# Patient Record
Sex: MALE | Race: WHITE | NOT HISPANIC OR LATINO | Employment: FULL TIME | URBAN - NONMETROPOLITAN AREA
[De-identification: names, ages, dates, MRNs, and addresses within clinical notes are randomized per-mention and may not be internally consistent; named-entity substitution may affect disease eponyms.]

---

## 2020-08-29 ENCOUNTER — HOSPITAL ENCOUNTER (EMERGENCY)
Facility: HOSPITAL | Age: 30
Discharge: HOME | End: 2020-08-29
Attending: EMERGENCY MEDICINE
Payer: COMMERCIAL

## 2020-08-29 VITALS
OXYGEN SATURATION: 99 % | TEMPERATURE: 97.7 F | SYSTOLIC BLOOD PRESSURE: 162 MMHG | RESPIRATION RATE: 18 BRPM | DIASTOLIC BLOOD PRESSURE: 100 MMHG | HEART RATE: 124 BPM

## 2020-08-29 DIAGNOSIS — L03.90 CELLULITIS: Primary | ICD-10-CM

## 2020-08-29 PROCEDURE — 86618 LYME DISEASE ANTIBODY: CPT | Performed by: EMERGENCY MEDICINE

## 2020-08-29 PROCEDURE — 99284 EMERGENCY DEPT VISIT MOD MDM: CPT | Performed by: EMERGENCY MEDICINE

## 2020-08-29 PROCEDURE — 99283 EMERGENCY DEPT VISIT LOW MDM: CPT

## 2020-08-29 PROCEDURE — 36415 COLL VENOUS BLD VENIPUNCTURE: CPT | Performed by: EMERGENCY MEDICINE

## 2020-08-29 RX ORDER — SULFAMETHOXAZOLE AND TRIMETHOPRIM 800; 160 MG/1; MG/1
1 TABLET ORAL ONCE
Status: COMPLETED | OUTPATIENT
Start: 2020-08-29 | End: 2020-08-29

## 2020-08-29 RX ORDER — CEPHALEXIN 500 MG/1
500 CAPSULE ORAL EVERY 6 HOURS SCHEDULED
Qty: 40 CAPSULE | Refills: 0 | Status: SHIPPED | OUTPATIENT
Start: 2020-08-29 | End: 2020-09-08

## 2020-08-29 RX ORDER — CEPHALEXIN 250 MG/1
500 CAPSULE ORAL ONCE
Status: COMPLETED | OUTPATIENT
Start: 2020-08-29 | End: 2020-08-29

## 2020-08-29 RX ORDER — SULFAMETHOXAZOLE AND TRIMETHOPRIM 800; 160 MG/1; MG/1
1 TABLET ORAL 2 TIMES DAILY
Qty: 20 TABLET | Refills: 0 | Status: SHIPPED | OUTPATIENT
Start: 2020-08-29 | End: 2020-09-08

## 2020-08-29 RX ORDER — AMLODIPINE BESYLATE 5 MG/1
5 TABLET ORAL DAILY
COMMUNITY

## 2020-08-29 RX ADMIN — CEPHALEXIN 500 MG: 250 CAPSULE ORAL at 23:04

## 2020-08-29 RX ADMIN — SULFAMETHOXAZOLE AND TRIMETHOPRIM 1 TABLET: 800; 160 TABLET ORAL at 23:04

## 2020-08-30 NOTE — DISCHARGE INSTRUCTIONS
Warm compresses to the area  Take ibuprofen as needed  Please follow-up with your family doctor on Monday  Return if symptoms worsen

## 2020-08-30 NOTE — ED PROVIDER NOTES
History  Chief Complaint   Patient presents with    Foot Swelling     Patient reports small red area to right ankle that he first noticed on Thursday night  Since then pain, swelling and redness has has gotten worse  Was seen at PCP yesterday and states the took blood work but unsure of what they ordered  Complained of a small red round area to the right ankle on Thursday  Now with increasing swelling and redness  No fevers or chills  No nausea vomiting diarrhea  Relief with ibuprofen  Not diabetic  History provided by:  Patient   used: No    Medical Problem   Location:  Right ankle swelling  Quality:  Achy  Severity:  Mild  Onset quality:  Gradual  Duration:  2 days  Timing:  Constant  Progression:  Worsening  Chronicity:  New  Context:  Possible bug bite  Relieved by:  Ibuprofen  Worsened by:  Weight-bearing  Ineffective treatments:  None tried  Associated symptoms: no abdominal pain, no chest pain, no congestion, no cough, no diarrhea, no ear pain, no fever, no headaches, no nausea, no rash, no rhinorrhea, no shortness of breath, no sore throat, no vomiting and no wheezing        Prior to Admission Medications   Prescriptions Last Dose Informant Patient Reported? Taking? amLODIPine (NORVASC) 5 mg tablet   Yes Yes   Sig: Take 5 mg by mouth daily      Facility-Administered Medications: None       Past Medical History:   Diagnosis Date    Hypertension        Past Surgical History:   Procedure Laterality Date    CHOLECYSTECTOMY      HERNIA REPAIR      KNEE SURGERY         History reviewed  No pertinent family history  I have reviewed and agree with the history as documented      E-Cigarette/Vaping    E-Cigarette Use Never User      E-Cigarette/Vaping Substances     Social History     Tobacco Use    Smoking status: Never Smoker    Smokeless tobacco: Never Used   Substance Use Topics    Alcohol use: Yes     Frequency: Monthly or less    Drug use: Not Currently       Review of Systems   Constitutional: Negative for chills and fever  HENT: Negative for congestion, ear pain, hearing loss, rhinorrhea, sore throat, trouble swallowing and voice change  Eyes: Negative for pain and discharge  Respiratory: Negative for cough, shortness of breath and wheezing  Cardiovascular: Negative for chest pain and palpitations  Gastrointestinal: Negative for abdominal pain, blood in stool, constipation, diarrhea, nausea and vomiting  Genitourinary: Negative for dysuria, flank pain, frequency and hematuria  Musculoskeletal: Negative for joint swelling, neck pain and neck stiffness  Skin: Negative for rash and wound  Neurological: Negative for dizziness, seizures, syncope, facial asymmetry and headaches  Psychiatric/Behavioral: Negative for hallucinations, self-injury and suicidal ideas  All other systems reviewed and are negative  Physical Exam  Physical Exam  Constitutional:       Appearance: Normal appearance  HENT:      Head: Normocephalic  Musculoskeletal:      Comments: Right ankle with erythema and warmth on the medial aspect just posterior to the medial malleolus  Dorsalis pedis pulses 2+  No definite fluctuance or discharge noted  Neurological:      Mental Status: He is alert           Vital Signs  ED Triage Vitals [08/29/20 2254]   Temperature Pulse Respirations Blood Pressure SpO2   97 7 °F (36 5 °C) (!) 120 18 (!) 165/101 99 %      Temp Source Heart Rate Source Patient Position - Orthostatic VS BP Location FiO2 (%)   Temporal Monitor Sitting Right arm --      Pain Score       7           Vitals:    08/29/20 2254   BP: (!) 165/101   Pulse: (!) 120   Patient Position - Orthostatic VS: Sitting         Visual Acuity      ED Medications  Medications   sulfamethoxazole-trimethoprim (BACTRIM DS) 800-160 mg per tablet 1 tablet (has no administration in time range)   cephalexin (KEFLEX) capsule 500 mg (has no administration in time range)       Diagnostic Studies  Results Reviewed     Procedure Component Value Units Date/Time    Lyme Antibody Profile with reflex to WB [[id number]]     Lab Status:  No result Specimen:  Blood                  No orders to display              Procedures  Procedures         ED Course       US AUDIT      Most Recent Value   Initial Alcohol Screen: US AUDIT-C    1  How often do you have a drink containing alcohol? 2 Filed at: 08/29/2020 2255   2  How many drinks containing alcohol do you have on a typical day you are drinking? 1 Filed at: 08/29/2020 2255   3a  Male UNDER 65: How often do you have five or more drinks on one occasion? 1 Filed at: 08/29/2020 2255   Audit-C Score  4 Filed at: 08/29/2020 2255                  ARMAAN/DAST-10      Most Recent Value   How many times in the past year have you    Used an illegal drug or used a prescription medication for non-medical reasons? Never Filed at: 08/29/2020 2256                                MDM      Disposition  Final diagnoses:   Cellulitis     Time reflects when diagnosis was documented in both MDM as applicable and the Disposition within this note     Time User Action Codes Description Comment    8/29/2020 10:58 PM Iván Patriciaole Add [L03 90] Cellulitis       ED Disposition     ED Disposition Condition Date/Time Comment    Discharge Stable Sat Aug 29, 2020 10:58 PM Deb Dry discharge to home/self care              Follow-up Information    None         Patient's Medications   Discharge Prescriptions    CEPHALEXIN (KEFLEX) 500 MG CAPSULE    Take 1 capsule (500 mg total) by mouth every 6 (six) hours for 10 days       Start Date: 8/29/2020 End Date: 9/8/2020       Order Dose: 500 mg       Quantity: 40 capsule    Refills: 0    SULFAMETHOXAZOLE-TRIMETHOPRIM (BACTRIM DS) 800-160 MG PER TABLET    Take 1 tablet by mouth 2 (two) times a day for 10 days smx-tmp DS (BACTRIM) 800-160 mg tabs (1tab q12 D10)       Start Date: 8/29/2020 End Date: 9/8/2020       Order Dose: 1 tablet Quantity: 20 tablet    Refills: 0     No discharge procedures on file      PDMP Review     None          ED Provider  Electronically Signed by           Eder Yates MD  08/29/20 1088

## 2020-09-01 LAB — B BURGDOR IGG+IGM SER-ACNC: <0.91 ISR (ref 0–0.9)

## 2021-08-07 ENCOUNTER — HOSPITAL ENCOUNTER (EMERGENCY)
Facility: HOSPITAL | Age: 31
Discharge: HOME | End: 2021-08-07
Attending: STUDENT IN AN ORGANIZED HEALTH CARE EDUCATION/TRAINING PROGRAM
Payer: COMMERCIAL

## 2021-08-07 VITALS
HEART RATE: 68 BPM | HEIGHT: 70 IN | DIASTOLIC BLOOD PRESSURE: 101 MMHG | RESPIRATION RATE: 20 BRPM | SYSTOLIC BLOOD PRESSURE: 175 MMHG | WEIGHT: 225 LBS | BODY MASS INDEX: 32.21 KG/M2 | OXYGEN SATURATION: 100 % | TEMPERATURE: 96.8 F

## 2021-08-07 DIAGNOSIS — M62.838 TRAPEZIUS MUSCLE SPASM: Primary | ICD-10-CM

## 2021-08-07 PROCEDURE — 99284 EMERGENCY DEPT VISIT MOD MDM: CPT | Performed by: STUDENT IN AN ORGANIZED HEALTH CARE EDUCATION/TRAINING PROGRAM

## 2021-08-07 PROCEDURE — 99283 EMERGENCY DEPT VISIT LOW MDM: CPT

## 2021-08-07 PROCEDURE — 96372 THER/PROPH/DIAG INJ SC/IM: CPT

## 2021-08-07 PROCEDURE — 20552 NJX 1/MLT TRIGGER POINT 1/2: CPT | Performed by: STUDENT IN AN ORGANIZED HEALTH CARE EDUCATION/TRAINING PROGRAM

## 2021-08-07 RX ORDER — TRIAMCINOLONE ACETONIDE 40 MG/ML
40 INJECTION, SUSPENSION INTRA-ARTICULAR; INTRAMUSCULAR ONCE
Status: COMPLETED | OUTPATIENT
Start: 2021-08-07 | End: 2021-08-07

## 2021-08-07 RX ORDER — LIDOCAINE 50 MG/G
1 PATCH TOPICAL ONCE
Status: DISCONTINUED | OUTPATIENT
Start: 2021-08-07 | End: 2021-08-07 | Stop reason: HOSPADM

## 2021-08-07 RX ORDER — DIAZEPAM 5 MG/1
5 TABLET ORAL EVERY 6 HOURS PRN
Qty: 6 TABLET | Refills: 0 | Status: SHIPPED | OUTPATIENT
Start: 2021-08-07

## 2021-08-07 RX ADMIN — LIDOCAINE 1 PATCH: 50 PATCH TOPICAL at 09:17

## 2021-08-07 RX ADMIN — TRIAMCINOLONE ACETONIDE 40 MG: 40 INJECTION, SUSPENSION INTRA-ARTICULAR; INTRAMUSCULAR at 09:17

## 2021-08-07 NOTE — DISCHARGE INSTRUCTIONS
You were evaluated in the emergency department for bilateral neck/upper back pain  You are administered to trigger point injections which provided relief  For pain, you can take Motrin 600 mg every 6 hours and/or Tylenol 1000 mg every 6 hours  You can also apply Aspercreme/Salonpas lidocaine patches  You are also being provided with few doses of Valium which helped with muscle relaxation  Do not drink alcohol/drive while taking Valium  Do not hesitate to return to the emergency department for any concerning signs or symptoms

## 2021-08-07 NOTE — Clinical Note
Lianet Hurd was seen and treated in our emergency department on 8/7/2021  Diagnosis:     Margi Branch    He may return on this date:     Mr Lianet Hurd is limited to light duty on 8/8, 8/9, 8/10  If you have any questions or concerns, please don't hesitate to call        Micky Srivastava DO    ______________________________           _______________          _______________  Hospital Representative                              Date                                Time

## 2021-08-07 NOTE — ED PROVIDER NOTES
History  Chief Complaint   Patient presents with    Neck Pain     pt states neck pain started 4 days ago, thought he just slept on it wrong but nothing is working for pain relief at home  pain progressively getting worse  pt denies fevers, numbness tingling in arms  no headache       History provided by:  Patient  Neck Pain  Pain location:  L side and R side  Quality:  Stiffness and aching  Stiffness is present:  All day  Radiates to: Bilateral trapezius  Pain severity:  Moderate  Pain is:  Unable to specify  Onset quality:  Gradual  Timing:  Intermittent  Progression:  Worsening  Chronicity:  New  Context: not fall, not jumping from heights, not MVC and not recent injury    Relieved by:  Position  Worsened by:  Twisting and bending  Ineffective treatments:  NSAIDs, muscle relaxants and analgesics  Associated symptoms: no chest pain, no fever, no headaches, no numbness and no weakness       26year old M  States that he has been having worsening left sided neck stiffness/pain x 4 days  No known injury  Localizes the stiffness/pain to the bilateral trapezius muscles with radiation to the cervical paraspinal musculature  Describes the pain as achy in nature and 7/10 in severity  The pain is only present when he twists/turns his neck/torso  Has been taking Aleve/Tylenol without relief  Took a dose of Flexeril yesterday without relief  Denies numbness/tingling/weakness in his upper extremities  Prior to Admission Medications   Prescriptions Last Dose Informant Patient Reported? Taking? amLODIPine (NORVASC) 5 mg tablet 8/6/2021 at Unknown time  Yes Yes   Sig: Take 5 mg by mouth daily      Facility-Administered Medications: None     Past Medical History:   Diagnosis Date    Hypertension      Past Surgical History:   Procedure Laterality Date    CHOLECYSTECTOMY      HERNIA REPAIR      KNEE SURGERY       History reviewed  No pertinent family history    I have reviewed and agree with the history as documented  E-Cigarette/Vaping    E-Cigarette Use Never User      E-Cigarette/Vaping Substances     Social History     Tobacco Use    Smoking status: Never Smoker    Smokeless tobacco: Never Used   Vaping Use    Vaping Use: Never used   Substance Use Topics    Alcohol use: Yes    Drug use: Not Currently     Review of Systems   Constitutional: Negative for chills and fever  HENT: Negative for congestion, rhinorrhea, sinus pressure and sinus pain  Eyes: Negative for pain, redness and visual disturbance  Respiratory: Negative for apnea, cough, shortness of breath and wheezing  Cardiovascular: Negative for chest pain and palpitations  Gastrointestinal: Negative for abdominal pain, diarrhea, nausea and vomiting  Genitourinary: Negative for difficulty urinating, dysuria, flank pain and urgency  Musculoskeletal: Positive for back pain, myalgias, neck pain and neck stiffness  Skin: Negative for color change, rash and wound  Neurological: Negative for dizziness, syncope, weakness, numbness and headaches  Hematological: Does not bruise/bleed easily  Psychiatric/Behavioral: Negative for confusion and sleep disturbance  The patient is not nervous/anxious  Physical Exam  Physical Exam  Vitals and nursing note reviewed  Constitutional:       General: He is in acute distress  Appearance: He is not ill-appearing  HENT:      Head: Normocephalic and atraumatic  Right Ear: External ear normal       Left Ear: External ear normal       Nose: Nose normal  No congestion or rhinorrhea  Mouth/Throat:      Mouth: Mucous membranes are moist       Pharynx: Oropharynx is clear  No oropharyngeal exudate or posterior oropharyngeal erythema  Eyes:      General:         Right eye: No discharge  Left eye: No discharge  Extraocular Movements: Extraocular movements intact  Conjunctiva/sclera: Conjunctivae normal       Pupils: Pupils are equal, round, and reactive to light  Cardiovascular:      Rate and Rhythm: Normal rate and regular rhythm  Pulses: Normal pulses  Heart sounds: Normal heart sounds  Pulmonary:      Effort: Pulmonary effort is normal       Breath sounds: Normal breath sounds  No wheezing, rhonchi or rales  Chest:      Chest wall: No tenderness  Abdominal:      General: Abdomen is flat  Palpations: Abdomen is soft  Tenderness: There is no abdominal tenderness  There is no right CVA tenderness, left CVA tenderness, guarding or rebound  Musculoskeletal:         General: Tenderness present  No swelling, deformity or signs of injury  Cervical back: Neck supple  No tenderness  Back:       Comments: Localized tenderness to palpation and hypertonicity along the left thoracic paraspinal musculature, right trapezius  No overlying skin changes or bruising noted  No midline back tenderness  Skin:     General: Skin is warm and dry  Capillary Refill: Capillary refill takes less than 2 seconds  Findings: No erythema or rash  Neurological:      General: No focal deficit present  Mental Status: He is alert and oriented to person, place, and time  Mental status is at baseline  Cranial Nerves: No cranial nerve deficit  Sensory: No sensory deficit  Motor: No weakness  Gait: Gait normal    Psychiatric:         Mood and Affect: Mood normal          Behavior: Behavior normal          Thought Content:  Thought content normal          Judgment: Judgment normal          Vital Signs  ED Triage Vitals [08/07/21 0901]   Temperature Pulse Respirations Blood Pressure SpO2   (!) 96 8 °F (36 °C) 68 20 (!) 175/101 100 %      Temp Source Heart Rate Source Patient Position - Orthostatic VS BP Location FiO2 (%)   Temporal Monitor Sitting Right arm --      Pain Score       2           Vitals:    08/07/21 0901   BP: (!) 175/101   Pulse: 68   Patient Position - Orthostatic VS: Sitting     ED Medications  Medications triamcinolone acetonide (KENALOG-40) 40 mg/mL injection 40 mg (40 mg Intramuscular Given by Other 8/7/21 7997)     Diagnostic Studies  Results Reviewed     None             No orders to display          Procedures  Trigger Injection 1 or 2 muscles    Date/Time: 8/7/2021 9:20 AM  Performed by: Jn Wakefield DO  Authorized by: Jn Wakefield DO     Patient location:  ED and bedside  Consent:     Consent obtained:  Verbal    Consent given by:  Patient    Risks discussed:  Infection, unsuccessful block, pain and bleeding  Universal protocol:     Procedure explained and questions answered to patient or proxy's satisfaction: yes      Site marked: yes    Location:     Body area:  Upper back and neck    Injection Trigger Points:  2  Pre-procedure details:     Skin preparation:  Antiseptic wash  Skin anesthesia:     Skin anesthesia method:  None  Procedure details:     Needle gauge:  25 G    Anesthetic injected:  Lidocaine 1% w/o epi    Steroid injected:  Triamcinolone    Additive injected:  None    Injection procedure:  Anatomic landmarks identified, incremental injection, negative aspiration for blood, anatomic landmarks palpated and introduced needle  Post-procedure details:     Dressing:  None    Outcome:  Pain relieved    Patient tolerance of procedure: Tolerated well, no immediate complications      ED Course       MDM     41-year-old male  Presents to the emergency department with bilateral trapezius tenderness, hypertonicity  No known injury  On exam, the patient has hypertonicity along the bilateral trapezius, left thoracic paraspinal musculature  No tenderness to palpation along the midline spine  Trigger point injections were performed at the bedside which provided relief  See procedural note for further details  The patient was prescribed a short course of Valium PRN for muscle spasms  Other supportive care measures and return precautions were discussed with the patient    He expressed understanding  All questions were addressed  The patient was stable for discharge  Disposition  Final diagnoses:   Trapezius muscle spasm     Time reflects when diagnosis was documented in both MDM as applicable and the Disposition within this note     Time User Action Codes Description Comment    8/7/2021  9:27 AM Gayleen Osler Add [P49 204] Trapezius muscle spasm       ED Disposition     ED Disposition Condition Date/Time Comment    Discharge Stable Sat Aug 7, 2021  9:27 AM Garrison Cruz discharge to home/self care  Follow-up Information     Follow up With Specialties Details Why Contact Info    Deanne Carter DO Pediatrics  As needed [address] [facility] [address]  [phone]            Discharge Medication List as of 8/7/2021  9:30 AM      START taking these medications    Details   diazepam (VALIUM) 5 mg tablet Take 1 tablet (5 mg total) by mouth every 6 (six) hours as needed for muscle spasms, Starting Sat 8/7/2021, Normal         CONTINUE these medications which have NOT CHANGED    Details   amLODIPine (NORVASC) 5 mg tablet Take 5 mg by mouth daily, Historical Med           No discharge procedures on file      PDMP Review     None          ED Provider  Electronically Signed by           Liliana Hubbard DO  08/07/21 7893

## 2023-12-23 ENCOUNTER — OFFICE VISIT (OUTPATIENT)
Dept: URGENT CARE | Facility: CLINIC | Age: 33
End: 2023-12-23
Payer: COMMERCIAL

## 2023-12-23 VITALS
HEIGHT: 70 IN | TEMPERATURE: 96.1 F | OXYGEN SATURATION: 96 % | BODY MASS INDEX: 31.5 KG/M2 | WEIGHT: 220 LBS | SYSTOLIC BLOOD PRESSURE: 136 MMHG | DIASTOLIC BLOOD PRESSURE: 88 MMHG | RESPIRATION RATE: 18 BRPM | HEART RATE: 94 BPM

## 2023-12-23 DIAGNOSIS — R05.1 ACUTE COUGH: Primary | ICD-10-CM

## 2023-12-23 LAB
SARS-COV-2 AG UPPER RESP QL IA: NEGATIVE
VALID CONTROL: NORMAL

## 2023-12-23 PROCEDURE — 87811 SARS-COV-2 COVID19 W/OPTIC: CPT

## 2023-12-23 PROCEDURE — G0382 LEV 3 HOSP TYPE B ED VISIT: HCPCS

## 2023-12-23 RX ORDER — DEXTROMETHORPHAN HYDROBROMIDE AND PROMETHAZINE HYDROCHLORIDE 15; 6.25 MG/5ML; MG/5ML
5 SYRUP ORAL 4 TIMES DAILY PRN
Qty: 118 ML | Refills: 0 | Status: SHIPPED | OUTPATIENT
Start: 2023-12-23

## 2023-12-23 RX ORDER — PREDNISONE 10 MG/1
TABLET ORAL
Qty: 21 TABLET | Refills: 0 | Status: SHIPPED | OUTPATIENT
Start: 2023-12-23

## 2023-12-23 NOTE — PATIENT INSTRUCTIONS
Take steroid as prescribed  Use cough medication as needed  Plenty of fluids  Can use honey   Cool mist humidifier   Warm gargle with salt water for sore throat   Use Tylenol/ibuprofen as needed for fever or pain    Follow up with PCP in 3-5 days.  Proceed to  ER if symptoms worsen.

## 2023-12-23 NOTE — PROGRESS NOTES
[facility]        NAME: Timo Parker is a 33 y.o. male  : 1990    MRN: [id number]  DATE: 2023  TIME: 12:08 PM    Assessment and Plan   Acute cough [R05.1]  1. Acute cough  Poct Covid 19 Rapid Antigen Test    predniSONE 10 mg tablet    promethazine-dextromethorphan (PHENERGAN-DM) 6.25-15 mg/5 mL oral syrup        Rapid covid: neg    Patient Instructions     Take steroid as prescribed  Use cough medication as needed  Plenty of fluids  Can use honey   Cool mist humidifier   Warm gargle with salt water for sore throat   Use Tylenol/ibuprofen as needed for fever or pain    Follow up with PCP in 3-5 days.  Proceed to  ER if symptoms worsen.    Chief Complaint     Chief Complaint   Patient presents with    Cold Like Symptoms     Body aches, cough, chest congestion, yellow mucous, headaches, chills/hot flashes; symptoms started 4 days ago, does feel like he's a little better today.          History of Present Illness       URI   This is a new problem. Episode onset: 4 days. Associated symptoms include congestion, coughing (productive) and headaches. Pertinent negatives include no chest pain, ear pain, rhinorrhea, sneezing, sore throat or wheezing.       Review of Systems   Review of Systems   Constitutional:  Positive for chills. Negative for fatigue and fever.   HENT:  Positive for congestion. Negative for ear pain, postnasal drip, rhinorrhea, sinus pressure, sneezing, sore throat and tinnitus.    Eyes:  Negative for photophobia, redness and itching.   Respiratory:  Positive for cough (productive). Negative for chest tightness, shortness of breath and wheezing.    Cardiovascular:  Negative for chest pain.   Musculoskeletal:  Positive for myalgias.   Skin:  Negative for color change and pallor.   Neurological:  Positive for headaches. Negative for dizziness and light-headedness.   Psychiatric/Behavioral:  Negative for confusion.          Current Medications       Current Outpatient  "Medications:     amLODIPine (NORVASC) 5 mg tablet, Take 5 mg by mouth daily, Disp: , Rfl:     predniSONE 10 mg tablet, Take six pills on day 1, take five pills on day 2, take four pills on day 3, take three pills on day 4, take two pills on day 5, take one pill on day 6, Disp: 21 tablet, Rfl: 0    promethazine-dextromethorphan (PHENERGAN-DM) 6.25-15 mg/5 mL oral syrup, Take 5 mL by mouth 4 (four) times a day as needed for cough, Disp: 118 mL, Rfl: 0    diazepam (VALIUM) 5 mg tablet, Take 1 tablet (5 mg total) by mouth every 6 (six) hours as needed for muscle spasms (Patient not taking: Reported on 12/23/2023), Disp: 6 tablet, Rfl: 0    Current Allergies     Allergies as of 12/23/2023 - Reviewed 12/23/2023   Allergen Reaction Noted    Rosemary oil - food allergy  08/29/2020            The following portions of the patient's history were reviewed and updated as appropriate: allergies, current medications, past family history, past medical history, past social history, past surgical history and problem list.     Past Medical History:   Diagnosis Date    Hypertension        Past Surgical History:   Procedure Laterality Date    CHOLECYSTECTOMY      HERNIA REPAIR      KNEE SURGERY         Family History   Problem Relation Age of Onset    No Known Problems Mother     No Known Problems Father          Medications have been verified.        Objective   /88   Pulse 94   Temp (!) 96.1 °F (35.6 °C)   Resp 18   Ht 5' 10\" (1.778 m)   Wt 99.8 kg (220 lb)   SpO2 96%   BMI 31.57 kg/m²        Physical Exam     Physical Exam  Constitutional:       Appearance: Normal appearance.   HENT:      Head: Normocephalic.      Right Ear: Tympanic membrane and external ear normal.      Left Ear: Tympanic membrane and external ear normal.      Mouth/Throat:      Mouth: Mucous membranes are moist.      Pharynx: Oropharynx is clear.   Eyes:      Extraocular Movements: Extraocular movements intact.      Conjunctiva/sclera: Conjunctivae " normal.      Pupils: Pupils are equal, round, and reactive to light.   Cardiovascular:      Rate and Rhythm: Normal rate and regular rhythm.      Pulses: Normal pulses.      Heart sounds: Normal heart sounds.   Pulmonary:      Effort: Pulmonary effort is normal. No respiratory distress.      Breath sounds: Normal breath sounds. No stridor. No wheezing, rhonchi or rales.   Musculoskeletal:      Cervical back: No tenderness.   Lymphadenopathy:      Cervical: No cervical adenopathy.   Skin:     General: Skin is warm and dry.   Neurological:      General: No focal deficit present.      Mental Status: He is alert and oriented to person, place, and time. Mental status is at baseline.   Psychiatric:         Mood and Affect: Mood normal.         Behavior: Behavior normal.         Thought Content: Thought content normal.         Judgment: Judgment normal.

## 2025-01-02 ENCOUNTER — TELEPHONE (OUTPATIENT)
Age: 35
End: 2025-01-02

## 2025-01-02 NOTE — TELEPHONE ENCOUNTER
New Patient    What is the reason for the patient’s appointment?:patient called stating he would like to schedule vasectomy consult    Pt scheduled with Dr. D'amico for 01/22/25    What office location does the patient prefer?:[place]     Does patient have Imaging/Lab Results:    Have patient records been requested?:  If No, are the records showing in Epic:       INSURANCE:   Do we accept the patient's insurance or is the patient Self-Pay?:    Insurance Provider:[employer]   Plan Type/Number:   Member ID#:       HISTORY:   Has the patient had any previous Urologist(s)?:no     Was the patient seen in the ED?:    Has the patient had any outside testing done?:    Does the patient have a personal history of cancer?:

## 2025-01-22 ENCOUNTER — OFFICE VISIT (OUTPATIENT)
Dept: UROLOGY | Facility: CLINIC | Age: 35
End: 2025-01-22
Payer: COMMERCIAL

## 2025-01-22 VITALS
DIASTOLIC BLOOD PRESSURE: 78 MMHG | OXYGEN SATURATION: 98 % | HEART RATE: 89 BPM | HEIGHT: 70 IN | TEMPERATURE: 98 F | BODY MASS INDEX: 33.36 KG/M2 | SYSTOLIC BLOOD PRESSURE: 130 MMHG | WEIGHT: 233 LBS

## 2025-01-22 DIAGNOSIS — Z30.2 ENCOUNTER FOR STERILIZATION: Primary | ICD-10-CM

## 2025-01-22 PROCEDURE — 99203 OFFICE O/P NEW LOW 30 MIN: CPT | Performed by: UROLOGY

## 2025-01-22 RX ORDER — LORAZEPAM 1 MG/1
1 TABLET ORAL ONCE
Qty: 1 TABLET | Refills: 0 | Status: SHIPPED | OUTPATIENT
Start: 2025-01-22 | End: 2025-01-22

## 2025-01-22 NOTE — PROGRESS NOTES
"UROLOGY PROGRESS NOTE         NAME: Timo Parker  AGE: 34 y.o. SEX: male  : 1990   MRN: [id number]    DATE: 2025  TIME: 10:53 AM    Assessment and Plan      Impression:   1. Encounter for sterilization  Patient wishes to proceed with vasectomy.  Pros cons options outcomes discussed.     Plan: Proceed with vasectomy at his discretion.  Ativan prior.  Instructions discussed.      Chief Complaint     Chief Complaint   Patient presents with    vasectomy consult     History of Present Illness     HPI: Timo Parker is a 34 y.o. year old male who presents with 3 kids age 5 months to 6 years.  3 boys currently using condoms.  Wife is off of birth control.  Wishes to proceed with vasectomy.  He had hernia repairs in the past as well as a varicocelectomy on the left side.  No erectile dysfunction no voiding dysfunction.  No hematuria no stone disease.  Doing well clinically.  Pros cons options discussed.  Importance of rest for couple of days importance of continuing contraception.  Discussed.  Long-term risk discussed.              The following portions of the patient's history were reviewed and updated as appropriate: allergies, current medications, past family history, past medical history, past social history, past surgical history and problem list.  Past Medical History:   Diagnosis Date    Hypertension      Past Surgical History:   Procedure Laterality Date    CHOLECYSTECTOMY      CHOLECYSTECTOMY      HERNIA REPAIR      HERNIA REPAIR      KNEE ARTHROSCOPY      KNEE SURGERY      SKIN GRAFT      VARICOCELE EXCISION       shoulder  Review of Systems     Const: Denies chills, fever and weight loss.  CV: Denies chest pain.  Resp: Denies SOB.  GI: Denies abdominal pain, nausea and vomiting.  : Denies symptoms other than stated above.  Musculo: Denies back pain.    Objective   /78   Pulse 89   Temp 98 °F (36.7 °C)   Ht 5' 10\" (1.778 m)   Wt 106 kg (233 lb)   SpO2 98%   BMI 33.43 kg/m² "     Physical Exam  Const: Appears healthy and well developed. No signs of acute distress present.  Resp: Respirations are regular and unlabored.   CV: Rate is regular. Rhythm is regular.  Abdomen: Abdomen is soft, nontender, and nondistended. Kidneys are not palpable.  : Both vasa palpable.  Testicles normal.  No hernias.  Psych: Patient's attitude is cooperative. Mood is normal. Affect is normal.    Current Medications     Current Outpatient Medications:     amLODIPine (NORVASC) 5 mg tablet, Take 5 mg by mouth daily, Disp: , Rfl:     diazepam (VALIUM) 5 mg tablet, Take 1 tablet (5 mg total) by mouth every 6 (six) hours as needed for muscle spasms (Patient not taking: Reported on 1/22/2025), Disp: 6 tablet, Rfl: 0    predniSONE 10 mg tablet, Take six pills on day 1, take five pills on day 2, take four pills on day 3, take three pills on day 4, take two pills on day 5, take one pill on day 6 (Patient not taking: Reported on 1/22/2025), Disp: 21 tablet, Rfl: 0    promethazine-dextromethorphan (PHENERGAN-DM) 6.25-15 mg/5 mL oral syrup, Take 5 mL by mouth 4 (four) times a day as needed for cough (Patient not taking: Reported on 1/22/2025), Disp: 118 mL, Rfl: 0        Frank D'Amico, MD

## 2025-02-17 ENCOUNTER — TELEPHONE (OUTPATIENT)
Dept: UROLOGY | Facility: CLINIC | Age: 35
End: 2025-02-17

## 2025-02-17 NOTE — TELEPHONE ENCOUNTER
Spoke with pt, confirmed vasectomy appt on 2/19/25, will have , take ativan as directed and shave scrotum.  Nothing else needed.

## 2025-02-19 ENCOUNTER — PROCEDURE VISIT (OUTPATIENT)
Dept: UROLOGY | Facility: CLINIC | Age: 35
End: 2025-02-19
Payer: COMMERCIAL

## 2025-02-19 VITALS
OXYGEN SATURATION: 97 % | SYSTOLIC BLOOD PRESSURE: 132 MMHG | WEIGHT: 232.6 LBS | HEART RATE: 107 BPM | HEIGHT: 70 IN | BODY MASS INDEX: 33.3 KG/M2 | DIASTOLIC BLOOD PRESSURE: 76 MMHG | TEMPERATURE: 98.2 F

## 2025-02-19 DIAGNOSIS — Z30.2 ENCOUNTER FOR STERILIZATION: Primary | ICD-10-CM

## 2025-02-19 PROCEDURE — 88302 TISSUE EXAM BY PATHOLOGIST: CPT | Performed by: STUDENT IN AN ORGANIZED HEALTH CARE EDUCATION/TRAINING PROGRAM

## 2025-02-19 PROCEDURE — 55250 REMOVAL OF SPERM DUCT(S): CPT | Performed by: UROLOGY

## 2025-02-19 RX ORDER — CIPROFLOXACIN 500 MG/1
500 TABLET, FILM COATED ORAL ONCE
Status: COMPLETED | OUTPATIENT
Start: 2025-02-19 | End: 2025-02-20

## 2025-02-19 RX ORDER — OXYCODONE AND ACETAMINOPHEN 5; 325 MG/1; MG/1
1-2 TABLET ORAL EVERY 6 HOURS PRN
Qty: 20 TABLET | Refills: 0 | Status: SHIPPED | OUTPATIENT
Start: 2025-02-19

## 2025-02-19 NOTE — PROGRESS NOTES
"    Vasectomy     Date/Time  2/19/2025 11:15 AM     Performed by  Frank D'Amico, MD   Authorized by  Frank D'Amico, MD     Universal Protocol   procedure performed by consultantConsent: Verbal consent obtained. Written consent obtained.  Risks and benefits: risks, benefits and alternatives were discussed  Consent given by: patient  Time out: Immediately prior to procedure a \"time out\" was called to verify the correct patient, procedure, equipment, support staff and site/side marked as required.  Timeout called at: 2/19/2025 12:48 PM.  Patient identity confirmed: verbally with patient      Local anesthesia used: yes      Anesthesia: local infiltration     Anesthesia   Local anesthesia used: yes  Local Anesthetic: bupivacaine 0.5% without epinephrine  Anesthetic total: 4 mL     Sedation   Patient sedated: no        Specimen: yes    Culture: no   Procedure Details   Procedure Notes: Patient underwent bilateral vasectomy.  Supine position prepped and draped.  Left vas deferens palpated anesthetized with injection and small 7 mm incision made in the mid scrotum.  Base of the penis.  Left vas deferens was dissected out and a small portion was removed and sent to the pathologist.  Both ends were tied off with nylon suture both ends were  from each other with chromic suture and both ends were cauterized intraluminally with cautery.  There is minimal no bleeding.  This was dropped into the hemiscrotum without adhesions and after the bleeding was controlled.  Same procedure was done on the right side same findings.  Also dropped into the hemiscrotum without adhesions.  The skin was closed with a full-thickness Monocryl suture.  Glue was applied.  Instructions were discussed patient understands he needs to take it easy the next 2 days he needs to continue contraception until 2 samples show no sperm.  He was given 1 dose of Cipro.  There were no complications.             "

## 2025-02-20 RX ADMIN — CIPROFLOXACIN 500 MG: 500 TABLET, FILM COATED ORAL at 08:39

## 2025-02-24 PROCEDURE — 88302 TISSUE EXAM BY PATHOLOGIST: CPT | Performed by: STUDENT IN AN ORGANIZED HEALTH CARE EDUCATION/TRAINING PROGRAM

## 2025-04-25 ENCOUNTER — TELEPHONE (OUTPATIENT)
Age: 35
End: 2025-04-25

## 2025-04-25 NOTE — TELEPHONE ENCOUNTER
Patient calling asking if he can drop off his semen analysis to our [place] office.    Informed him it needs to be scheduled and taken to one of out Hospital labs within an hour of collection.    Advised him to call [place] to inquire, agreeable

## 2025-04-25 NOTE — TELEPHONE ENCOUNTER
Pt called to confirm he truly does need an appointment scheduled for a specimen drop off as he was told at his procedure appointment he can bring the sample back to the office. Pt was informed an appointment is require.     Pt questioned if the newer [facility] building closer to his home would be able to obtain the specimen, call was transferred to  for further assistance.

## 2025-04-28 ENCOUNTER — APPOINTMENT (OUTPATIENT)
Dept: LAB | Facility: HOSPITAL | Age: 35
End: 2025-04-28
Attending: UROLOGY
Payer: COMMERCIAL

## 2025-04-28 DIAGNOSIS — Z30.2 ENCOUNTER FOR STERILIZATION: ICD-10-CM

## 2025-04-28 LAB
DEPRECATED CD4 CELLS/CD8 CELLS BLD: 2 ML
SPERM MOTILE SMN QL MICRO: ABNORMAL

## 2025-04-28 PROCEDURE — 89321 SEMEN ANAL SPERM DETECTION: CPT

## 2025-04-29 ENCOUNTER — RESULTS FOLLOW-UP (OUTPATIENT)
Dept: UROLOGY | Facility: CLINIC | Age: 35
End: 2025-04-29

## 2025-04-29 NOTE — TELEPHONE ENCOUNTER
----- Message from Frank D'Amico, MD sent at 4/29/2025 10:50 AM EDT -----  Please let patient know semen shows rare nonmotile sperm.  He should continue protection and drop another sample off in 1 month.  Thank you  ----- Message -----  From: Lab, Background User  Sent: 4/28/2025  10:10 AM EDT  To: Frank D'Amico, MD

## 2025-05-27 ENCOUNTER — TELEPHONE (OUTPATIENT)
Dept: UROLOGY | Facility: CLINIC | Age: 35
End: 2025-05-27

## 2025-05-27 NOTE — TELEPHONE ENCOUNTER
Spoke to patient and reminded him to get semen analysis done. He will make an appt. Pt informed to continue using protection until results are in. Pt understood

## 2025-06-11 ENCOUNTER — APPOINTMENT (OUTPATIENT)
Dept: LAB | Facility: HOSPITAL | Age: 35
End: 2025-06-11
Attending: UROLOGY
Payer: COMMERCIAL

## 2025-06-11 DIAGNOSIS — Z30.2 ENCOUNTER FOR STERILIZATION: ICD-10-CM

## 2025-06-11 LAB
DEPRECATED CD4 CELLS/CD8 CELLS BLD: 2 ML
SPERM MOTILE SMN QL MICRO: ABNORMAL

## 2025-06-11 PROCEDURE — 89321 SEMEN ANAL SPERM DETECTION: CPT

## 2025-07-13 ENCOUNTER — OFFICE VISIT (OUTPATIENT)
Dept: URGENT CARE | Facility: CLINIC | Age: 35
End: 2025-07-13
Payer: COMMERCIAL

## 2025-07-13 VITALS
RESPIRATION RATE: 18 BRPM | HEART RATE: 85 BPM | SYSTOLIC BLOOD PRESSURE: 142 MMHG | HEIGHT: 70 IN | WEIGHT: 225 LBS | OXYGEN SATURATION: 99 % | TEMPERATURE: 98.1 F | BODY MASS INDEX: 32.21 KG/M2 | DIASTOLIC BLOOD PRESSURE: 82 MMHG

## 2025-07-13 DIAGNOSIS — L08.9 LOCAL INFECTION OF THE SKIN AND SUBCUTANEOUS TISSUE, UNSPECIFIED: Primary | ICD-10-CM

## 2025-07-13 PROCEDURE — S9083 URGENT CARE CENTER GLOBAL: HCPCS | Performed by: PHYSICIAN ASSISTANT

## 2025-07-13 PROCEDURE — G0382 LEV 3 HOSP TYPE B ED VISIT: HCPCS | Performed by: PHYSICIAN ASSISTANT

## 2025-07-13 RX ORDER — DOXYCYCLINE 100 MG/1
100 CAPSULE ORAL 2 TIMES DAILY
Qty: 28 CAPSULE | Refills: 0 | Status: SHIPPED | OUTPATIENT
Start: 2025-07-13 | End: 2025-07-27

## 2025-07-13 NOTE — PROGRESS NOTES
[facility]  Name: Timo Parker      : 1990      MRN: [id number]  Encounter Provider: Nori Javed PA-C  Encounter Date: 2025   Encounter department: [facility]  :  Assessment & Plan  Local infection of the skin and subcutaneous tissue, unspecified    Orders:    doxycycline monohydrate (MONODOX) 100 mg capsule; Take 1 capsule (100 mg total) by mouth 2 (two) times a day for 14 days      Assessment & Plan        Patient Instructions  Medication as prescribed  Monitor for worsening symptoms  Follow up with PCP in 3-5 days.  Proceed to  ER if symptoms worsen.    If tests are performed, our office will contact you with results only if changes need to made to the care plan discussed with you at the visit. [facility]    Eat yogurt with live and active cultures and/or take a probiotic at least 3 hours before or after antibiotic dose. Monitor stool for diarrhea and/or blood. If this occurs, contact primary care doctor ASAP.     Doxycycline may cause your skin to be more sensitive to sunlight than it is normally. Exposure to sunlight, even for short periods of time, may cause skin rash, itching, redness or other discoloration of the skin, or a severe sunburn. Practice proper precautions including avoiding excessive sunlight exposure, wear skin protection including clothing and sunscreen to avoid reaction.     Chief Complaint:   Chief Complaint   Patient presents with    possible cellulitis     C/O redness to right lateral foot and ankle area which has increased in size progressively. Reports noted had what looked like an insect bite in the area on 25. Area itches.     History of Present Illness   History of Present Illness      Reports possible insect bite on . Admits to worsening redness/dusky appearance to lateral aspect of R ankle. +neck pain at night x 2 days. No fevers, chills, pain, nausea, HA.  "Reports prior hx/o Lyme disease. States he is concerned about a possible tick bite.     Review of Systems   Constitutional:  Negative for chills and fever.   Gastrointestinal:  Negative for nausea and vomiting.   Musculoskeletal:  Positive for myalgias and neck pain (at night only).   Skin:  Positive for color change and rash.   Neurological:  Negative for headaches.     Past Medical History   Past Medical History[1]  Past Surgical History[2]  Family History[3]  he reports that he has never smoked. He has never used smokeless tobacco. He reports current alcohol use. He reports that he does not currently use drugs.  Current Outpatient Medications   Medication Instructions    amLODIPine (NORVASC) 5 mg, Daily    diazepam (VALIUM) 5 mg, Oral, Every 6 hours PRN    doxycycline monohydrate (MONODOX) 100 mg, Oral, 2 times daily    LORazepam (ATIVAN) 1 mg, Oral, Once, Take 1 hour prior to vasectomy    oxyCODONE-acetaminophen (PERCOCET) 5-325 mg per tablet 1-2 tablets, Oral, Every 6 hours PRN    predniSONE 10 mg tablet Take six pills on day 1, take five pills on day 2, take four pills on day 3, take three pills on day 4, take two pills on day 5, take one pill on day 6    promethazine-dextromethorphan (PHENERGAN-DM) 6.25-15 mg/5 mL oral syrup 5 mL, Oral, 4 times daily PRN   Allergies[4]     Objective   /82   Pulse 85   Temp 98.1 °F (36.7 °C)   Resp 18   Ht 5' 10\" (1.778 m)   Wt 102 kg (225 lb)   SpO2 99%   BMI 32.28 kg/m²      Physical Exam  Constitutional:       General: He is not in acute distress.     Appearance: He is well-developed. He is not diaphoretic.   HENT:      Head: Normocephalic and atraumatic.     Cardiovascular:      Rate and Rhythm: Normal rate and regular rhythm.      Heart sounds: Normal heart sounds. No murmur heard.  Pulmonary:      Effort: Pulmonary effort is normal. No respiratory distress.      Breath sounds: Normal breath sounds.   Lymphadenopathy:      Cervical: No cervical adenopathy. " "    Skin:     Findings: Rash (blanching) present. No erythema.      Comments:        Neurological:      Mental Status: He is alert and oriented to person, place, and time.       Physical Exam      Results    Portions of the record may have been created with voice recognition software.  Occasional wrong word or \"sound a like\" substitutions may have occurred due to the inherent limitations of voice recognition software.  Read the chart carefully and recognize, using context, where substitutions have occurred.       [1]   Past Medical History:  Diagnosis Date    Hypertension    [2]   Past Surgical History:  Procedure Laterality Date    CHOLECYSTECTOMY      CHOLECYSTECTOMY      HERNIA REPAIR      HERNIA REPAIR      KNEE ARTHROSCOPY      KNEE SURGERY      SKIN GRAFT      VARICOCELE EXCISION     [3]   Family History  Problem Relation Name Age of Onset    No Known Problems Mother      No Known Problems Father     [4]   Allergies  Allergen Reactions    Sheba Oil - Food Allergy      "

## 2025-07-13 NOTE — PATIENT INSTRUCTIONS
Medication as prescribed  Monitor for worsening symptoms  Follow up with PCP in 3-5 days.  Proceed to  ER if symptoms worsen.    If tests are performed, our office will contact you with results only if changes need to made to the care plan discussed with you at the visit. You can review your full results on [facility].    Eat yogurt with live and active cultures and/or take a probiotic at least 3 hours before or after antibiotic dose. Monitor stool for diarrhea and/or blood. If this occurs, contact primary care doctor ASAP.     Doxycycline may cause your skin to be more sensitive to sunlight than it is normally. Exposure to sunlight, even for short periods of time, may cause skin rash, itching, redness or other discoloration of the skin, or a severe sunburn. Practice proper precautions including avoiding excessive sunlight exposure, wear skin protection including clothing and sunscreen to avoid reaction.

## 2025-08-05 DIAGNOSIS — Z30.2 ENCOUNTER FOR STERILIZATION: Primary | ICD-10-CM
